# Patient Record
(demographics unavailable — no encounter records)

---

## 2025-05-23 NOTE — PROCEDURE
[Medium Joint Injection] : medium joint injection [Left] : of the left [Elbow Joint] : elbow joint [Other: ___] : [unfilled] [Risks, benefits, alternatives discussed / Verbal consent obtained] : the risks benefits, and alternatives have been discussed, and verbal consent was obtained [de-identified] : 15 cc [de-identified] : Serosanguineous

## 2025-05-23 NOTE — DATA REVIEWED
[Left] : left [Elbow] : elbow [FreeTextEntry1] : 3 views of the left elbow were obtained here in the office today and show: No fracture or dislocation.

## 2025-05-23 NOTE — DISCUSSION/SUMMARY
[de-identified] : Today we discussed aspiration of the left arm bursa and the patient agreed.  I did explain to her that the risk of infection would be higher since there was a small superficial wound just distal to the olecranon.  The area was cleansed and prepped in the usual sterile fashion and then 18-gauge needle was inserted into the olecranon bursa.  15 cc of serosanguineous fluid was removed.  The puncture site was covered with a Band-Aid, she tolerated the procedure well.  She was advised to avoid direct pressure on the elbow, she was wrapped in an Ace wrap for compression and advised to be in the Ace wrap at all times except for hygiene.  She will follow-up in 1 month for further evaluation with Dr. Bales.

## 2025-05-23 NOTE — HISTORY OF PRESENT ILLNESS
[de-identified] : The patient is a 60-year-old female here for evaluation of her left elbow she is right-hand dominant.  On 5/9/2025 she fell injuring the elbow.  She noticed swelling in the elbow, she points over the olecranon.  She was going to see her orthopedic in [place] but they could not fit her in for an appointment.

## 2025-05-23 NOTE — IMAGING
[de-identified] : Physical exam of the left elbow: There is olecranon bursitis present.  No erythema or ecchymosis present.  There is a healing wound just distal to the olecranon, no surrounding erythema or drainage from the wound..  Full range of motion without pain.  Mild tenderness to palpation over the olecranon.  Intact distal biceps.